# Patient Record
Sex: MALE | Race: WHITE
[De-identification: names, ages, dates, MRNs, and addresses within clinical notes are randomized per-mention and may not be internally consistent; named-entity substitution may affect disease eponyms.]

---

## 2022-07-05 ENCOUNTER — HOSPITAL ENCOUNTER (OUTPATIENT)
Dept: HOSPITAL 46 - DS | Age: 62
Discharge: HOME | End: 2022-07-05
Attending: SURGERY
Payer: COMMERCIAL

## 2022-07-05 VITALS — BODY MASS INDEX: 30.25 KG/M2 | WEIGHT: 216.05 LBS | HEIGHT: 71 IN

## 2022-07-05 DIAGNOSIS — Z90.49: ICD-10-CM

## 2022-07-05 DIAGNOSIS — K64.0: ICD-10-CM

## 2022-07-05 DIAGNOSIS — E66.9: ICD-10-CM

## 2022-07-05 DIAGNOSIS — K57.30: ICD-10-CM

## 2022-07-05 DIAGNOSIS — Z12.11: Primary | ICD-10-CM

## 2022-07-05 DIAGNOSIS — D12.6: ICD-10-CM

## 2022-07-05 PROCEDURE — 0DBE8ZX EXCISION OF LARGE INTESTINE, VIA NATURAL OR ARTIFICIAL OPENING ENDOSCOPIC, DIAGNOSTIC: ICD-10-PCS | Performed by: SURGERY

## 2022-07-05 PROCEDURE — G0500 MOD SEDAT ENDO SERVICE >5YRS: HCPCS

## 2022-07-06 NOTE — PATH
[facility]
                                    [address], [place]  [address]
_________________________________________________________________________________________
                                                                 Signed   
 
 
 
SPECIMEN(S): A COLON POLYP AT 60 CM
SPECIMEN(S): B COLON POLYP AT 48 CM
 
SPECIMEN SOURCE:
A. COLON POLYP AT 60 CM
B. COLON POLYP AT 48 CM
 
CLINICAL HISTORY:
Colonoscopy.  History of polyps, diverticulosis, internal hemorrhoids.  Postop: 
Internal hemorrhoids, colon polyps. 
 
FINAL PATHOLOGIC DIAGNOSIS:
A.  Colon, polyp at 60 cm, polypectomy:
-  Tubular adenoma.
-  Negative for high-grade dysplasia or malignancy.
B.  Colon, polyp at 48 cm, polypectomy:
-  Favor cauterized tubular adenoma, see comment.
-  Negative for high-grade dysplasia or malignancy.
 
COMMENT:
Regarding specimen B: Additional deeper levels were examined.  There is 
epithelial atypia that is favored be low-grade dysplasia, however cautery 
artifact precludes definitive histologic 
classification.
NAL:cml:C2NR
 
MICROSCOPIC EXAMINATION:
Histologic sections of all submitted blocks are examined by light microscopy.  
These findings, together with the gross examination, support the pathologic 
diagnosis. 
 
GROSS DESCRIPTION:
Two specimens are received in two containers, labeled "DD."
A.  The specimen, labeled "DD, colon polyp at 60," is received in formalin and 
consists of one tan soft tissue fragment that measures 0.2 cm in greatest 
dimension.  The specimen is entirely submitted 
in cassette (A1).
B.  The specimen, labeled "DD, colon polyp at 48 cm," is received in formalin 
and consists of one tan soft tissue fragment that measures 0.1 cm in greatest 
dimension.  The specimen is entirely 
submitted in cassette (B1).
 
                                                                                    
_________________________________________________________________________________________
PATIENT NAME:     FELISA MAI                       
MEDICAL RECORD #: [id number]            PATHOLOGY                     
          ACCT #: [id number]       ACCESSION #: [id number]     
DATE OF BIRTH:   [birth year]            REPORT #: [id number]       
PHYSICIAN:        CARO STILES              
PCP:              EMERSON RODRÍGUEZ              
REPORT IS CONFIDENTIAL AND NOT TO BE RELEASED WITHOUT AUTHORIZATION
 
 
                                  [facility]
                                    [address] [address]
_________________________________________________________________________________________
                                                                 Signed   
 
 
JS (under the direct supervision of a pathologist)
The Gross Description was prepared using a voice recognition system. The report 
was reviewed for accuracy; however, sound-alike word errors, addition and/or 
deletions may occur. If there is any 
question about this report, please contact Client Services.
 
PERFORMING LABORATORY:
The technical component was performed by [facility] [address] (CLIA# [id number]). Professional interpretation was 
performed by [facility], [address] [address] (CLIA# 
[id number]). 
 
Diagnostician:  Hedy Milian MD
Pathologist
Electronically Signed 07/06/2022
 
 
Copies:                                
~
 
 
 
 
 
 
 
 
 
 
 
 
 
 
 
 
 
 
 
 
 
 
 
                                                                                    
_________________________________________________________________________________________
PATIENT NAME:     FELISA MAI                       
MEDICAL RECORD #: [id number]            PATHOLOGY                     
          ACCT #: [id number]       ACCESSION #: [id number]     
DATE OF BIRTH:   [birth year]            REPORT #: [id number]       
PHYSICIAN:        [facility]              
PCP:              EMERSON RODRÍGUEZ              
REPORT IS CONFIDENTIAL AND NOT TO BE RELEASED WITHOUT AUTHORIZATION

## 2022-07-06 NOTE — OR
[facility]
                                    [address], [place]  [address]
_________________________________________________________________________________________
                                                                 Signed   
 
 
DATE OF OPERATION:
07/05/2022
 
SURGEON:
Saad Molina MD
 
PREOPERATIVE DIAGNOSES:
1. Personal history of hyperplastic colonic polyp in 2006.
2. Internal hemorrhoids.
3. Diverticulosis.
4. Sigmoidectomy in 2012 with Rodriguez side-to-end colorectal anastomosis.
 
POSTOPERATIVE DIAGNOSES:
1. Minimal internal hemorrhoids.
2. Rodriguez colorectal anastomosis at 20 cm.
3. 5 mm polyp at 60 cm.
4. 4 mm polyp at 40 cm.
 
PROCEDURE:
Colonoscopy with hot biopsy.
 
ESTIMATED BLOOD LOSS:
None.
 
INDICATIONS:
Felisa is a 62-year-old gentleman, asked to see me for followup colonoscopy.  Dr. Goodwin
did his colonoscopy in 2006 at the age of 46.  He had rectal bleeding at that time.  He
had a tiny hyperplastic polyp removed.  He was said to have internal hemorrhoids.  There
was no diverticulosis at that time.  He did well with Versed and fentanyl.  I helped him
with a colonoscopy in 2011 at age of 51 for the recurrent persistent diverticulitis.  I
performed his open sigmoid resection with Baker side-to-end colorectal anastomosis in
2012.  He was age 52.  He said he has done great ever since.  He is now retired from the
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
__________.  He said there is no family history of colon cancer or polyps.  In the
office, I gave him a pamphlet on colonoscopy.  He remembers the test quite well.  There
is risk including, but not limited to gas bloating, crampy abdominal pain, bleeding,
perforation requiring surgery, and missed diagnosis.  We also discussed the need for the
IV conscious sedation.  He had expressed understanding and wished to proceed. 
 
PROCEDURE NOTE:
Felisa was taken into our endoscopy suite and placed in the left lateral decubitus
position.  He was given a total of 5 mg of Versed and 100 mcg of fentanyl to cover the
 
    Electronically Signed By: SAAD MOLINA MD  07/06/22 0610
_________________________________________________________________________________________
PATIENT NAME:     FELISA MAI                       
MEDICAL RECORD #: [id number]            OPERATIVE REPORT              
          ACCT #: [id number]  
DATE OF BIRTH:   [birth year]            REPORT #: [id number]      
PHYSICIAN:        SAAD MOLINA MD             
PCP:              CEE RODRÍGUEZ              
REPORT IS CONFIDENTIAL AND NOT TO BE RELEASED WITHOUT AUTHORIZATION
 
 
                                  [facility]
                                    [address], [place]  [address]
_________________________________________________________________________________________
                                                                 Signed   
 
 
case.  A digital rectal exam was performed and this was unremarkable.  He had good
sphincter tone.  No external hemorrhoids.  The adult colonoscope had been introduced and
advanced all around into the cecum under direct visualization of the camera without
difficulty.  His prep was good.  We could easily see the appendiceal orifice and the
ileocecal valve.  We took pictures throughout for photodocumentation.  He had two polyps
removed with the help of hot biopsy forceps.  Once again, there was no diverticulosis
remaining.  We could easily see the anastomosis at 20 cm.  It is well healed.  There is
no granulation tissue or ulceration.  The rectum itself was unremarkable.  Upon
retroflexion of the scope, there was minimal internal hemorrhoid tissue.  After this,
the gas was suctioned out and the colonoscope removed.  Felisa tolerated the procedure
quite well. 
 
RECOMMENDATIONS:
I will see Felisa back in my office in 7 to 14 days to review his results.
 
 
 
            ________________________________________
            Saad Molina MD 
 
 
ALB/MODL
Job #:  [id number]
DD:  07/05/2022 08:15:10
DT:  07/05/2022 09:43:56
 
cc:            MD Cee uGthrie PA
 
 
Copies:  SAAD MOLINA MD, LINDA PA
~
 
 
 
 
 
 
 
 
 
    Electronically Signed By: SAAD MOLINA MD  07/06/22 0610
_________________________________________________________________________________________
PATIENT NAME:     FELISA MAI SALOME                       
MEDICAL RECORD #: [id number]            OPERATIVE REPORT              
          ACCT #: [id number]  
DATE OF BIRTH:   [birth year]            REPORT #: [id number]      
PHYSICIAN:        SAAD MOLINA MD             
PCP:              CEE RODRÍGUEZ              
REPORT IS CONFIDENTIAL AND NOT TO BE RELEASED WITHOUT AUTHORIZATION